# Patient Record
Sex: MALE | Race: WHITE | NOT HISPANIC OR LATINO | Employment: OTHER | URBAN - METROPOLITAN AREA
[De-identification: names, ages, dates, MRNs, and addresses within clinical notes are randomized per-mention and may not be internally consistent; named-entity substitution may affect disease eponyms.]

---

## 2024-10-12 ENCOUNTER — APPOINTMENT (OUTPATIENT)
Facility: HOSPITAL | Age: 74
End: 2024-10-12
Payer: MEDICARE

## 2024-10-12 ENCOUNTER — HOSPITAL ENCOUNTER (EMERGENCY)
Facility: HOSPITAL | Age: 74
Discharge: HOME | End: 2024-10-12
Attending: EMERGENCY MEDICINE
Payer: MEDICARE

## 2024-10-12 VITALS
BODY MASS INDEX: 29.16 KG/M2 | RESPIRATION RATE: 16 BRPM | DIASTOLIC BLOOD PRESSURE: 83 MMHG | TEMPERATURE: 97.5 F | HEART RATE: 63 BPM | HEIGHT: 73 IN | SYSTOLIC BLOOD PRESSURE: 164 MMHG | OXYGEN SATURATION: 99 % | WEIGHT: 220 LBS
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
## 2024-10-12 DIAGNOSIS — S01.112A LACERATION OF LEFT EYEBROW, INITIAL ENCOUNTER: Primary | ICD-10-CM

## 2024-10-12 PROCEDURE — 25010000002 LIDOCAINE 1% - EPINEPHRINE 1:100000 1 %-1:100000 SOLUTION: Performed by: PHYSICIAN ASSISTANT

## 2024-10-12 PROCEDURE — 70450 CT HEAD/BRAIN W/O DYE: CPT

## 2024-10-12 PROCEDURE — 99284 EMERGENCY DEPT VISIT MOD MDM: CPT

## 2024-10-12 PROCEDURE — 70486 CT MAXILLOFACIAL W/O DYE: CPT

## 2024-10-12 RX ORDER — UBIDECARENONE 100 MG
100 CAPSULE ORAL DAILY
COMMUNITY

## 2024-10-12 RX ORDER — CLOPIDOGREL BISULFATE 75 MG/1
75 TABLET ORAL DAILY
COMMUNITY

## 2024-10-12 RX ORDER — VENLAFAXINE 37.5 MG/1
37.5 TABLET ORAL DAILY
COMMUNITY
End: 2024-10-12

## 2024-10-12 RX ORDER — UBIDECARENONE 75 MG
50 CAPSULE ORAL DAILY
COMMUNITY

## 2024-10-12 RX ORDER — METOPROLOL SUCCINATE 25 MG/1
50 TABLET, EXTENDED RELEASE ORAL DAILY
COMMUNITY
End: 2024-10-12

## 2024-10-12 RX ORDER — ORAL SEMAGLUTIDE 3 MG/1
3 TABLET ORAL DAILY
COMMUNITY

## 2024-10-12 RX ORDER — GLIMEPIRIDE 2 MG/1
2 TABLET ORAL EVERY 12 HOURS
COMMUNITY

## 2024-10-12 RX ORDER — METFORMIN HCL 500 MG
500 TABLET, EXTENDED RELEASE 24 HR ORAL
COMMUNITY
End: 2024-10-12

## 2024-10-12 RX ORDER — LIDOCAINE HYDROCHLORIDE AND EPINEPHRINE 10; 10 MG/ML; UG/ML
10 INJECTION, SOLUTION INFILTRATION; PERINEURAL ONCE
Status: COMPLETED | OUTPATIENT
Start: 2024-10-12 | End: 2024-10-12

## 2024-10-12 RX ORDER — VENLAFAXINE 75 MG/1
75 TABLET ORAL 2 TIMES DAILY
COMMUNITY

## 2024-10-12 RX ORDER — GLUCOSAMINE/D3/BOSWELLIA SERRA 1500MG-400
TABLET ORAL
COMMUNITY

## 2024-10-12 RX ORDER — TRIAMTERENE AND HYDROCHLOROTHIAZIDE 37.5; 25 MG/1; MG/1
1 CAPSULE ORAL EVERY MORNING
COMMUNITY

## 2024-10-12 RX ORDER — RABEPRAZOLE SODIUM 20 MG/1
20 TABLET, DELAYED RELEASE ORAL DAILY
COMMUNITY

## 2024-10-12 RX ADMIN — LIDOCAINE HYDROCHLORIDE,EPINEPHRINE BITARTRATE 10 ML: 10; .01 INJECTION, SOLUTION INFILTRATION; PERINEURAL at 12:50

## 2024-10-12 NOTE — FSED PROVIDER NOTE
"Subjective  History of Present Illness:    73-year-old male who presents with complaints of laceration to the forehead and left eyebrow after a fall.  Patient states that he was getting up out of bed, tripped on the bedding and fell onto his face.  Anticoagulated on Plavix.  Past medical history of type 2 diabetes, dementia, GERD.  Denies loss of consciousness.  He is complaining of nasal pain as well.  Denies any other injuries.  No blurry vision.  No dizziness.  He states that he does have a balance issue but is scheduled to see neurology for that.      Nurses Notes reviewed and agree, including vitals, allergies, social history and prior medical history.     REVIEW OF SYSTEMS: All systems reviewed and not pertinent unless noted.  Review of Systems    Past Medical History:   Diagnosis Date    Diabetic acidosis, type I     Hyperlipidemia     Hypertension     Renal disorder        Allergies:    Patient has no known allergies.      Past Surgical History:   Procedure Laterality Date    BACK SURGERY      JOINT REPLACEMENT           Social History     Socioeconomic History    Marital status:          History reviewed. No pertinent family history.    Objective  Physical Exam:  /87   Pulse 62   Temp 97.5 °F (36.4 °C) (Oral)   Resp 16   Ht 185.4 cm (73\")   Wt 99.8 kg (220 lb)   SpO2 99%   BMI 29.03 kg/m²      Physical Exam  Vitals and nursing note reviewed.   Constitutional:       Appearance: Normal appearance.   HENT:      Nose:      Comments: There is a contusion noted to the external nose. No septal hematoma.  Cardiovascular:      Rate and Rhythm: Normal rate and regular rhythm.   Pulmonary:      Effort: Pulmonary effort is normal.      Breath sounds: Normal breath sounds.   Skin:     Comments: There is a 3 cm irregularly shaped laceration to the left eyebrow. There is gaping and fatty tissue exposed. Bleeding is controlled.   Neurological:      General: No focal deficit present.      Mental Status: " He is alert and oriented to person, place, and time.         Laceration Repair    Date/Time: 10/12/2024 12:51 PM    Performed by: Callie Bull PA-C  Authorized by: Terrence Bell DO    Consent:     Consent obtained:  Verbal    Consent given by:  Patient    Risks, benefits, and alternatives were discussed: yes      Risks discussed:  Infection and pain    Alternatives discussed:  No treatment and delayed treatment  Universal protocol:     Procedure explained and questions answered to patient or proxy's satisfaction: yes      Relevant documents present and verified: yes      Imaging studies available: yes      Patient identity confirmed:  Verbally with patient  Anesthesia:     Anesthesia method:  Local infiltration    Local anesthetic:  Lidocaine 1% WITH epi  Laceration details:     Location:  Face    Face location:  L eyebrow    Length (cm):  3    Depth (mm):  5  Pre-procedure details:     Preparation:  Patient was prepped and draped in usual sterile fashion  Exploration:     Contaminated: no    Treatment:     Area cleansed with:  Chlorhexidine    Amount of cleaning:  Standard    Visualized foreign bodies/material removed: no    Skin repair:     Repair method:  Sutures    Suture size:  4-0    Suture technique:  Simple interrupted    Number of sutures:  8  Approximation:     Approximation:  Close  Repair type:     Repair type:  Simple  Post-procedure details:     Dressing:  Open (no dressing)    Procedure completion:  Tolerated well, no immediate complications      ED Course:         Lab Results (last 24 hours)       ** No results found for the last 24 hours. **             CT Head Without Contrast    Result Date: 10/12/2024  CT HEAD WO CONTRAST, CT FACIAL BONES WO CONTRAST Date of Exam: 10/12/2024 11:53 AM EDT Indication: Fall, on plavix. Comparison: None available. Technique: Axial CT images were obtained of the head and facial bones without contrast administration.  Automated exposure control and iterative  construction methods were used. Findings: Parenchyma:No acute intraparenchymal hemorrhage. No loss of gray-white differentiation to suggest large territory infarct. Mild parenchymal volume loss. Scattered periventricular and subcortical white matter hypodensities, nonspecific, but most often consistent with small vessel ischemic changes. No midline shift or herniation. Ventricles and extra axial spaces:Prominent ventricles and sulci secondary to volume loss. No extra axial fluid collection seen. Other:Lens replacements. Scattered paranasal sinus mucosal thickening. Mastoid air cells are clear. Calvarium is intact. No substantial intracranial atherosclerotic calcification. Left frontal scalp contusion. No evidence of orbital fracture. No evidence  of fracture of the facial bones. No suspicious adenopathy. Aerodigestive tract is grossly patent. Partially visualized degenerative changes of the cervical spine. No soft tissue mass or fluid collection.     Impression: Impression: No evidence of acute intracranial traumatic injury. Small left frontal scalp contusion. No evidence of fracture of the facial bones. Electronically Signed: Ronald Gusman MD  10/12/2024 12:19 PM EDT  Workstation ID: [id number]    CT Facial Bones Without Contrast    Result Date: 10/12/2024  CT HEAD WO CONTRAST, CT FACIAL BONES WO CONTRAST Date of Exam: 10/12/2024 11:53 AM EDT Indication: Fall, on plavix. Comparison: None available. Technique: Axial CT images were obtained of the head and facial bones without contrast administration.  Automated exposure control and iterative construction methods were used. Findings: Parenchyma:No acute intraparenchymal hemorrhage. No loss of gray-white differentiation to suggest large territory infarct. Mild parenchymal volume loss. Scattered periventricular and subcortical white matter hypodensities, nonspecific, but most often consistent with small vessel ischemic changes. No midline shift or herniation.  Ventricles and extra axial spaces:Prominent ventricles and sulci secondary to volume loss. No extra axial fluid collection seen. Other:Lens replacements. Scattered paranasal sinus mucosal thickening. Mastoid air cells are clear. Calvarium is intact. No substantial intracranial atherosclerotic calcification. Left frontal scalp contusion. No evidence of orbital fracture. No evidence  of fracture of the facial bones. No suspicious adenopathy. Aerodigestive tract is grossly patent. Partially visualized degenerative changes of the cervical spine. No soft tissue mass or fluid collection.     Impression: Impression: No evidence of acute intracranial traumatic injury. Small left frontal scalp contusion. No evidence of fracture of the facial bones. Electronically Signed: Ronald Gusman MD  10/12/2024 12:19 PM EDT  Workstation ID: [id number]        [facility]      Initial impression of presenting illness: Patient presents with complaints of facial laceration.  CT scan of the head and facial bones not reveal any acute fractures.  Laceration was closed with sutures.  Patient will have sutures removed in 7 days.  He states that his tetanus is up-to-date.  Discussed findings and plan of care with the patient and his daughter who are at the bedside.  They are agreeable to discharge    DDX: includes but is not limited to: laceration, skull fracture, contusion, intracranial hemorrhage      Medications   lidocaine 1% - EPINEPHrine [id number] (XYLOCAINE W/EPI) 1 %-1:085325 injection 10 mL (10 mL Injection Given 10/12/24 1250)         Data interpreted: Nursing notes reviewed, vital signs reviewed.  Labs independently interpreted by me (CBC, CMP, lipase, UA, troponin, ABG, lactic acid, procalcitonin).  Imaging independently interpreted by me (x-ray, CT scan).  EKG independently interpreted by me.  O2 saturation: 99%    Counseling: Discussed the results above with the patient regarding need for admission or discharge.  Patient understands and  agrees plan of care.      -----  ED Disposition       ED Disposition   Discharge    Condition   Stable    Comment   --             Final diagnoses:   Laceration of left eyebrow, initial encounter   Fall from bed, initial encounter      Your Follow-Up Providers       [facility] EMERGENCY DEPARTMENT [place]. Go in 1 week.    Specialty: Emergency Medicine  Follow up details: For suture removal  [address]  [facility] [address] [phone]                     Contact information for after-discharge care    Follow-up information has not been specified.                    Your medication list        CONTINUE taking these medications        Instructions Last Dose Given Next Dose Due   Biotin 16442 MCG tablet      Take  by mouth.       clopidogrel 75 MG tablet  Commonly known as: PLAVIX      Take 1 tablet by mouth Daily.       coenzyme Q10 100 MG capsule      Take 1 capsule by mouth Daily.       glimepiride 2 MG tablet  Commonly known as: AMARYL      Take 1 tablet by mouth Every 12 (Twelve) Hours. 2 mg (one tablet) in AM then 4 mg (2 tablets) in PM       Inclisiran Sodium 284 MG/1.5ML solution prefilled syringe      Inject 1.5 mL under the skin into the appropriate area as directed Every 3 (Three) Months.       metFORMIN 500 MG tablet  Commonly known as: GLUCOPHAGE      Take 1 tablet by mouth 2 (Two) Times a Day With Meals.       multivitamin with minerals tablet tablet      Take 1 tablet by mouth Daily.       OSTEO BI-FLEX ONE PER DAY PO      Take 1 tablet by mouth Daily.       RABEprazole 20 MG EC tablet  Commonly known as: ACIPHEX      Take 1 tablet by mouth Daily.       Rybelsus 3 MG tablet  Generic drug: Semaglutide      Take 1 tablet by mouth Daily.       triamterene-hydrochlorothiazide 37.5-25 MG per capsule  Commonly known as: DYAZIDE      Take 1 capsule by mouth Every Morning.       venlafaxine 75 MG tablet  Commonly known as: EFFEXOR      Take 1 tablet by mouth 2  (Two) Times a Day.       vitamin B-12 100 MCG tablet  Commonly known as: CYANOCOBALAMIN      Take 0.5 tablets by mouth Daily.

## 2024-10-19 ENCOUNTER — HOSPITAL ENCOUNTER (EMERGENCY)
Facility: HOSPITAL | Age: 74
Discharge: HOME | End: 2024-10-19
Attending: EMERGENCY MEDICINE
Payer: MEDICARE

## 2024-10-19 VITALS
HEIGHT: 73 IN | DIASTOLIC BLOOD PRESSURE: 82 MMHG | RESPIRATION RATE: 16 BRPM | TEMPERATURE: 96.7 F | OXYGEN SATURATION: 99 % | SYSTOLIC BLOOD PRESSURE: 138 MMHG | BODY MASS INDEX: 29.16 KG/M2 | WEIGHT: 220 LBS | HEART RATE: 71 BPM

## 2024-10-19 DIAGNOSIS — Z48.02 VISIT FOR SUTURE REMOVAL: Primary | ICD-10-CM

## 2024-10-19 PROCEDURE — 99202 OFFICE O/P NEW SF 15 MIN: CPT

## 2024-10-19 NOTE — FSED PROVIDER NOTE
"Subjective  History of Present Illness:    This is a 73-year-old male who presents for suture removal.  Patient was seen in this emergency department 7 days ago sutures placed above his left eyebrow by myself.  Patient had CT scan of the head at that time which did not reveal any acute findings.  Patient returns for suture removal.  He has had uneventful healing process.  He has not had any significant bleeding.  He is still having some swelling and bruising.      Nurses Notes reviewed and agree, including vitals, allergies, social history and prior medical history.     REVIEW OF SYSTEMS: All systems reviewed and not pertinent unless noted.  Review of Systems    Past Medical History:   Diagnosis Date    Diabetic acidosis, type I     Hyperlipidemia     Hypertension     Renal disorder        Allergies:    Patient has no known allergies.      Past Surgical History:   Procedure Laterality Date    BACK SURGERY      JOINT REPLACEMENT           Social History     Socioeconomic History    Marital status:          No family history on file.    Objective  Physical Exam:  /82 (BP Location: Left arm, Patient Position: Lying)   Pulse 71   Temp 96.7 °F (35.9 °C) (Oral)   Resp 16   Ht 185.4 cm (73\")   Wt 99.8 kg (220 lb)   SpO2 99%   BMI 29.03 kg/m²      Physical Exam    Suture Removal    Date/Time: 10/19/2024 2:40 PM    Performed by: Callie Bull PA-C  Authorized by: Tc Lynch MD    Consent:     Consent obtained:  Verbal    Risks, benefits, and alternatives were discussed: yes      Risks discussed:  Bleeding, pain and wound separation    Alternatives discussed:  No treatment and delayed treatment  Universal protocol:     Procedure explained and questions answered to patient or proxy's satisfaction: yes    Location:     Location:  Head/neck    Head/neck location:  Eyebrow    Eyebrow location:  L eyebrow  Procedure details:     Wound appearance:  No signs of infection, good wound healing and clean    " Number of sutures removed:  6  Post-procedure details:     Procedure completion:  Tolerated well, no immediate complications      ED Course:         Lab Results (last 24 hours)       ** No results found for the last 24 hours. **             No radiology results from the last 24 hrs       MDM      Initial impression of presenting illness: This is a 73-year-old male who presents for suture removal.  6 sutures were removed.  I did place 8 sutures a week ago it does appear that to have fallen out.  Patient had good healing.  No dehiscence.  No surrounding erythema.  Patient did have some continued contusion and swelling noted.  Patient given return precautions, wound care instructions.  Discussed findings and plan of care with the patient who is agreeable to discharge.    DDX: includes but is not limited to: Wound dehiscence, suture removal, cellulitis, abscess      Medications - No data to display      Data interpreted: Nursing notes reviewed, vital signs reviewed.  Labs independently interpreted by me (CBC, CMP, lipase, UA, troponin, ABG, lactic acid, procalcitonin).  Imaging independently interpreted by me (x-ray, CT scan).  EKG independently interpreted by me.  O2 saturation:    Counseling: Discussed the results above with the patient regarding need for admission or discharge.  Patient understands and agrees plan of care.      -----  ED Disposition       ED Disposition   Discharge    Condition   Stable    Comment   --             Final diagnoses:   Visit for suture removal      Your Follow-Up Providers       Go to  [facility] EMERGENCY DEPARTMENT [place].    Specialty: Emergency Medicine  Follow up details: As needed, If symptoms worsen  [address]  [facility] [address] [phone]                     Contact information for after-discharge care    Follow-up information has not been specified.                    Your medication list        CONTINUE taking these  medications        Instructions Last Dose Given Next Dose Due   Biotin 85298 MCG tablet      Take  by mouth.       clopidogrel 75 MG tablet  Commonly known as: PLAVIX      Take 1 tablet by mouth Daily.       coenzyme Q10 100 MG capsule      Take 1 capsule by mouth Daily.       glimepiride 2 MG tablet  Commonly known as: AMARYL      Take 1 tablet by mouth Every 12 (Twelve) Hours. 2 mg (one tablet) in AM then 4 mg (2 tablets) in PM       Inclisiran Sodium 284 MG/1.5ML solution prefilled syringe      Inject 1.5 mL under the skin into the appropriate area as directed Every 3 (Three) Months.       metFORMIN 500 MG tablet  Commonly known as: GLUCOPHAGE      Take 1 tablet by mouth 2 (Two) Times a Day With Meals.       multivitamin with minerals tablet tablet      Take 1 tablet by mouth Daily.       OSTEO BI-FLEX ONE PER DAY PO      Take 1 tablet by mouth Daily.       RABEprazole 20 MG EC tablet  Commonly known as: ACIPHEX      Take 1 tablet by mouth Daily.       Rybelsus 3 MG tablet  Generic drug: Semaglutide      Take 1 tablet by mouth Daily.       triamterene-hydrochlorothiazide 37.5-25 MG per capsule  Commonly known as: DYAZIDE      Take 1 capsule by mouth Every Morning.       venlafaxine 75 MG tablet  Commonly known as: EFFEXOR      Take 1 tablet by mouth 2 (Two) Times a Day.       vitamin B-12 100 MCG tablet  Commonly known as: CYANOCOBALAMIN      Take 0.5 tablets by mouth Daily.